# Patient Record
Sex: MALE | URBAN - METROPOLITAN AREA
[De-identification: names, ages, dates, MRNs, and addresses within clinical notes are randomized per-mention and may not be internally consistent; named-entity substitution may affect disease eponyms.]

---

## 2020-06-02 ENCOUNTER — VIRTUAL VISIT (OUTPATIENT)
Dept: FAMILY MEDICINE | Facility: OTHER | Age: 25
End: 2020-06-02

## 2020-06-03 NOTE — PROGRESS NOTES
"Date: 2020 14:20:57  Clinician: Fadi Shaw  Clinician NPI: [id number]  Patient: Valentino Javier  Patient : 1995  Patient Address: [address] [place] [address] [place] [address]  Patient Phone: [phone]  Visit Protocol: Low back pain  Patient Summary:  Valentino is a 24 year old ( : 1995 ) male who initiated a Visit for evaluation of Low Back Pain. When asked the question \"Please sign me up to receive news, health information and promotions from [employer].\", Valentino responded \"No\".   A synchronous visit is necessary because the patient reported the following abnormal symptoms:   Unable to walk on toes    Unable to heel walk   His back pain began suddenly 1-6 days ago. The pain is present on both sides and is located in the low back area.     Valentino is experiencing shooting pain.    Symptom Details   Pain: The pain is moderate (4-6 on a 10 point pain scale). The pain shoots into his lower legs.    Valentino denies loss of bowel control, saddle anesthesia, numbness or tingling in the legs, dysuria, hematuria, feeling feverish, loss of bladder control, a rash in the same area as the back pain, abdominal pain, urinary frequency, vomiting, urinary retention, leg weakness, and back muscle spasms. His pain does not decrease when bending forward.   Precipitating events  The back pain did not begin in response to an injury that happened at his work. His back pain began after something else other than the options provided.   Cause of back pain as reported by the patient (free text): My reasoning for this interview was previously stated. thank you   Pertinent medical history   Valentino has not had similar episodes of back pain in the past. He has not had unintended weight loss in the last three months, cancer, back surgery, and kidney stones.   He has not seen a provider to treat this episode of low back pain.   Treatments  Valentino has not tried using therapeutic remedies (such as cold pack, heat, chiropractic " treatment, physical therapy), over-the-counter medications, and prescription medications to manage his back pain.   Valentino does not need a return to work/school note.   Valentino does not smoke or use smokeless tobacco.   Reason for repeat visit for the same protocol within 24 hours:  Hello, i am currently not experiencing back pain, i was helping a friend of mine move and strained the muscle in my ankle, i took a couple of days off of work due to not being able to put a lot of pressure on it, and was seeking attention of possibly getting a doctors note for my ,    Thank You,  Rasheed Javier  See the History of referred by protocol and completed visits section for additional details on the previous visit.    MEDICATIONS: No current medications, ALLERGIES: NKDA  Clinician Response:  Dear Valentino,    .    Diagnosis: Pain in unspecified ankle and joints of unspecified foot  Diagnosis ICD: M25.579  Triage Notes: I reviewed the patient's history, verified their identity, and explained the Visit process.    Discussed with patient on the phone about his ankle injury.  Synchronous Triage: phone, status: completed, duration: 139 seconds